# Patient Record
Sex: FEMALE
[De-identification: names, ages, dates, MRNs, and addresses within clinical notes are randomized per-mention and may not be internally consistent; named-entity substitution may affect disease eponyms.]

---

## 2024-07-09 PROBLEM — Z00.00 ENCOUNTER FOR PREVENTIVE HEALTH EXAMINATION: Status: ACTIVE | Noted: 2024-07-09

## 2024-08-12 ENCOUNTER — APPOINTMENT (OUTPATIENT)
Dept: PHYSICAL MEDICINE AND REHAB | Facility: CLINIC | Age: 29
End: 2024-08-12
Payer: COMMERCIAL

## 2024-08-12 VITALS
SYSTOLIC BLOOD PRESSURE: 94 MMHG | HEART RATE: 90 BPM | DIASTOLIC BLOOD PRESSURE: 65 MMHG | HEIGHT: 61 IN | OXYGEN SATURATION: 99 % | BODY MASS INDEX: 22.66 KG/M2 | WEIGHT: 120 LBS

## 2024-08-12 DIAGNOSIS — Z78.9 OTHER SPECIFIED HEALTH STATUS: ICD-10-CM

## 2024-08-12 DIAGNOSIS — M54.2 CERVICALGIA: ICD-10-CM

## 2024-08-12 PROCEDURE — 99205 OFFICE O/P NEW HI 60 MIN: CPT

## 2024-08-12 RX ORDER — PREGABALIN 50 MG/1
50 CAPSULE ORAL TWICE DAILY
Qty: 60 | Refills: 2 | Status: ACTIVE | COMMUNITY
Start: 2024-08-12 | End: 1900-01-01

## 2024-08-17 NOTE — ASSESSMENT
[FreeTextEntry1] : Patient with neck pain headaches migraines 2 years postconcussion has tried multiple interventions without much relief and extensive discussion about further interventional options including medial branch blocks ultrasound-guided greater occipital nerve blocks epidural steroid injection.  Her MRI does show stenosis however clinically she is not presenting with radicular symptoms.  Has seen neurology in the past perhaps consider additional referral.  Plan as follows - Continue physical therapy will follow-up with Tavares Hernandez regarding treatment plan - Start Lyrica 50 twice daily -Consider modification of tricyclic to agent such as desipramine will coordinate with prescribing providers - Follow-up in 6 to 8 weeks  Dwayne Maldonado DO, FAAPMR Attending Physician, Interventional Pain Medicine  Department of Physical Medicine and Rehabilitation Count includes the [facility] | [place] [address] [address] [place] [address] [address]th [place] [address] Email: [email or web address]  I have spent greater than 60 minutes preparing to see the patient, collecting relevant history, performing a thorough history and physical examination, counseling the patient regarding my findings ordering the appropriate therapies and tests, communicating with other relevant healthcare professionals, documenting my encounter and coordinating care.

## 2024-08-17 NOTE — DATA REVIEWED
[MRI] : MRI [FreeTextEntry1] : 	 Exam requested by: FLAVIO HARPER MD [address] [facility], [address] [facility] [address] SITE PERFORMED: [facility] SITE PHONE: [phone] Patient: JUANA GOMEZ YOB: 1995 Phone: [phone] MRN: [id number] Acc: [id number] Date of Exam: 09-   EXAM:  MRI CERVICAL SPINE WITHOUT AND WITH CONTRAST  HISTORY:  Post concussion syndrome with headaches due to low CSF pressure, evaluate for CSF leak.  TECHNIQUE:  Multiplanar, multi-sequential MRI of the cervical spine was obtained on a 1.5T scanner using a standard protocol.  IV Contrast:  11 ml of Clariscan was injected from a 15 ml single use vial.  COMPARISON:  None.  FINDINGS:  OSSEOUS STRUCTURES: Vertebral body heights are preserved. No marrow edema, abnormal enhancement or destructive marrow infiltrative process.  ALIGNMENT: There slight reversal the normal cervical lordosis.  No significant scoliosis. No spondylolisthesis.  SPINAL CORD/CANAL: No epidural CSF fluid collection to indicate CSF leak. The cervical cord is normal in course, caliber and signal intensity without abnormal enhancement. No spinal canal stenosis.  POSTERIOR FOSSA/CERVICOMEDULLARY JUNCTION: Normal.  NECK/PARASPINAL SOFT TISSUES: Unremarkable.  INCLUDED THORACIC SPINE: Unremarkable.  DISCS: Disc heights are preserved. There is degenerative disc desiccation at C5-C6.  The following axial levels are imaged and detailed below:  C2-C3: No disc bulging or herniation. No spinal canal or foraminal stenosis.  C3-C4: No disc bulging or herniation. No spinal canal or foraminal stenosis.  C4-C5: No disc bulging or herniation. No spinal canal or foraminal stenosis.  C5-C6: With minimal annular disc bulge resulting in slight impression upon the ventral thecal sac. No spinal canal or foraminal stenosis.  C6-C7: No disc bulging or herniation. No spinal canal or foraminal stenosis.  C7-T1: No disc bulging or herniation. No spinal canal or foraminal stenosis.  IMPRESSION:   No MRI evidence for CSF leak of the cervical spine.  Minimal reversal the normal cervical lordosis. Degenerative disc desiccation at C5-C6 with minimal annular disc bulge.  Remaining postcontrast cervical spine MRI is within normal limits.  Thank you for the opportunity to participate in the care of this patient.     BEAU QUINTANILLA DO  - Electronically Signed: 09- 1:19 PM  Physician to Physician Direct Line is: [phone]

## 2024-08-17 NOTE — HISTORY OF PRESENT ILLNESS
[3] : a current pain level of 3/10 [1] : a minimum pain level of 1/10 [8] : a maximum pain level of 8/10 [Neck] : neck [___ yrs] : [unfilled] year(s) ago [FreeTextEntry1] : Patient presents for initial evaluation after injury 2 years ago where she fell and had a concussion. Since has been suffering with neck pain headaches and migraines has tried multiple treatments including trigger point injections and occipital nerve block ? has undergone extensive physical therapy without any substantial relief.  Denies any loss of balance loss of dexterity in the hands pain rating down the arms.

## 2024-08-17 NOTE — ASSESSMENT
[FreeTextEntry1] : Patient with neck pain headaches migraines 2 years postconcussion has tried multiple interventions without much relief and extensive discussion about further interventional options including medial branch blocks ultrasound-guided greater occipital nerve blocks epidural steroid injection.  Her MRI does show stenosis however clinically she is not presenting with radicular symptoms.  Has seen neurology in the past perhaps consider additional referral.  Plan as follows - Continue physical therapy will follow-up with Tavares Hernandez regarding treatment plan - Start Lyrica 50 twice daily -Consider modification of tricyclic to agent such as desipramine will coordinate with prescribing providers - Follow-up in 6 to 8 weeks  Dwayne Maldonado DO, FAAPMR Attending Physician, Interventional Pain Medicine  Department of Physical Medicine and Rehabilitation [facility] | [place] [address] [address] [place] [address] 6th [place] [address] Email: [email or web address]  I have spent greater than 60 minutes preparing to see the patient, collecting relevant history, performing a thorough history and physical examination, counseling the patient regarding my findings ordering the appropriate therapies and tests, communicating with other relevant healthcare professionals, documenting my encounter and coordinating care.

## 2024-08-17 NOTE — DATA REVIEWED
[MRI] : MRI [FreeTextEntry1] : 	 Exam requested by: FLAVIO HARPER MD 10 [facility], [address] [facility] [address] SITE PERFORMED: [facility] SITE PHONE: [phone] Patient: JUANA GOMEZ YOB: 1995 Phone: [phone] MRN: [id number] Acc: [id number] Date of Exam: 09-   EXAM:  MRI CERVICAL SPINE WITHOUT AND WITH CONTRAST  HISTORY:  Post concussion syndrome with headaches due to low CSF pressure, evaluate for CSF leak.  TECHNIQUE:  Multiplanar, multi-sequential MRI of the cervical spine was obtained on a 1.5T scanner using a standard protocol.  IV Contrast:  11 ml of Clariscan was injected from a 15 ml single use vial.  COMPARISON:  None.  FINDINGS:  OSSEOUS STRUCTURES: Vertebral body heights are preserved. No marrow edema, abnormal enhancement or destructive marrow infiltrative process.  ALIGNMENT: There slight reversal the normal cervical lordosis.  No significant scoliosis. No spondylolisthesis.  SPINAL CORD/CANAL: No epidural CSF fluid collection to indicate CSF leak. The cervical cord is normal in course, caliber and signal intensity without abnormal enhancement. No spinal canal stenosis.  POSTERIOR FOSSA/CERVICOMEDULLARY JUNCTION: Normal.  NECK/PARASPINAL SOFT TISSUES: Unremarkable.  INCLUDED THORACIC SPINE: Unremarkable.  DISCS: Disc heights are preserved. There is degenerative disc desiccation at C5-C6.  The following axial levels are imaged and detailed below:  C2-C3: No disc bulging or herniation. No spinal canal or foraminal stenosis.  C3-C4: No disc bulging or herniation. No spinal canal or foraminal stenosis.  C4-C5: No disc bulging or herniation. No spinal canal or foraminal stenosis.  C5-C6: With minimal annular disc bulge resulting in slight impression upon the ventral thecal sac. No spinal canal or foraminal stenosis.  C6-C7: No disc bulging or herniation. No spinal canal or foraminal stenosis.  C7-T1: No disc bulging or herniation. No spinal canal or foraminal stenosis.  IMPRESSION:   No MRI evidence for CSF leak of the cervical spine.  Minimal reversal the normal cervical lordosis. Degenerative disc desiccation at C5-C6 with minimal annular disc bulge.  Remaining postcontrast cervical spine MRI is within normal limits.  Thank you for the opportunity to participate in the care of this patient.     BEAU QUINTANILLA DO  - Electronically Signed: 09- 1:19 PM  Physician to Physician Direct Line is: [phone]

## 2024-08-17 NOTE — DATA REVIEWED
[MRI] : MRI [FreeTextEntry1] : 	 Exam requested by: FLAVIO HARPER MD 10 [facility], [address] [facility] [address] SITE PERFORMED: [facility] SITE PHONE: [phone] Patient: JUANA GOMEZ YOB: 1995 Phone: [phone] MRN: [id number] Acc: [id number] Date of Exam: 09-   EXAM:  MRI CERVICAL SPINE WITHOUT AND WITH CONTRAST  HISTORY:  Post concussion syndrome with headaches due to low CSF pressure, evaluate for CSF leak.  TECHNIQUE:  Multiplanar, multi-sequential MRI of the cervical spine was obtained on a 1.5T scanner using a standard protocol.  IV Contrast:  11 ml of Clariscan was injected from a 15 ml single use vial.  COMPARISON:  None.  FINDINGS:  OSSEOUS STRUCTURES: Vertebral body heights are preserved. No marrow edema, abnormal enhancement or destructive marrow infiltrative process.  ALIGNMENT: There slight reversal the normal cervical lordosis.  No significant scoliosis. No spondylolisthesis.  SPINAL CORD/CANAL: No epidural CSF fluid collection to indicate CSF leak. The cervical cord is normal in course, caliber and signal intensity without abnormal enhancement. No spinal canal stenosis.  POSTERIOR FOSSA/CERVICOMEDULLARY JUNCTION: Normal.  NECK/PARASPINAL SOFT TISSUES: Unremarkable.  INCLUDED THORACIC SPINE: Unremarkable.  DISCS: Disc heights are preserved. There is degenerative disc desiccation at C5-C6.  The following axial levels are imaged and detailed below:  C2-C3: No disc bulging or herniation. No spinal canal or foraminal stenosis.  C3-C4: No disc bulging or herniation. No spinal canal or foraminal stenosis.  C4-C5: No disc bulging or herniation. No spinal canal or foraminal stenosis.  C5-C6: With minimal annular disc bulge resulting in slight impression upon the ventral thecal sac. No spinal canal or foraminal stenosis.  C6-C7: No disc bulging or herniation. No spinal canal or foraminal stenosis.  C7-T1: No disc bulging or herniation. No spinal canal or foraminal stenosis.  IMPRESSION:   No MRI evidence for CSF leak of the cervical spine.  Minimal reversal the normal cervical lordosis. Degenerative disc desiccation at C5-C6 with minimal annular disc bulge.  Remaining postcontrast cervical spine MRI is within normal limits.  Thank you for the opportunity to participate in the care of this patient.     BEAU QUINTANILLA DO  - Electronically Signed: 09- 1:19 PM  Physician to Physician Direct Line is: [phone]

## 2024-08-17 NOTE — ASSESSMENT
[FreeTextEntry1] : Patient with neck pain headaches migraines 2 years postconcussion has tried multiple interventions without much relief and extensive discussion about further interventional options including medial branch blocks ultrasound-guided greater occipital nerve blocks epidural steroid injection.  Her MRI does show stenosis however clinically she is not presenting with radicular symptoms.  Has seen neurology in the past perhaps consider additional referral.  Plan as follows - Continue physical therapy will follow-up with Tavares Hernandez regarding treatment plan - Start Lyrica 50 twice daily -Consider modification of tricyclic to agent such as desipramine will coordinate with prescribing providers - Follow-up in 6 to 8 weeks  Dwayne Maldonado DO, FAAPMR Attending Physician, Interventional Pain Medicine  Department of Physical Medicine and Rehabilitation [facility] | [place] [address] [address] [place] [address] 6th [place] [address] Email: [email or web address]  I have spent greater than 60 minutes preparing to see the patient, collecting relevant history, performing a thorough history and physical examination, counseling the patient regarding my findings ordering the appropriate therapies and tests, communicating with other relevant healthcare professionals, documenting my encounter and coordinating care.

## 2024-09-27 ENCOUNTER — APPOINTMENT (OUTPATIENT)
Dept: PHYSICAL MEDICINE AND REHAB | Facility: CLINIC | Age: 29
End: 2024-09-27